# Patient Record
Sex: FEMALE | Race: WHITE | Employment: OTHER | URBAN - METROPOLITAN AREA
[De-identification: names, ages, dates, MRNs, and addresses within clinical notes are randomized per-mention and may not be internally consistent; named-entity substitution may affect disease eponyms.]

---

## 2022-08-14 ENCOUNTER — HOSPITAL ENCOUNTER (OUTPATIENT)
Age: 57
Setting detail: OBSERVATION
Discharge: HOME | End: 2022-08-15
Attending: EMERGENCY MEDICINE | Admitting: SPECIALIST
Payer: COMMERCIAL

## 2022-08-14 ENCOUNTER — APPOINTMENT (OUTPATIENT)
Dept: CT IMAGING | Age: 57
End: 2022-08-14
Payer: COMMERCIAL

## 2022-08-14 ENCOUNTER — APPOINTMENT (OUTPATIENT)
Dept: GENERAL RADIOLOGY | Age: 57
End: 2022-08-14
Payer: COMMERCIAL

## 2022-08-14 DIAGNOSIS — F41.9 ANXIETY: Primary | ICD-10-CM

## 2022-08-14 DIAGNOSIS — R07.9 CHEST PAIN, UNSPECIFIED TYPE: ICD-10-CM

## 2022-08-14 LAB
A/G RATIO: 2 (ref 1.1–2.2)
ALBUMIN SERPL-MCNC: 4.4 G/DL (ref 3.4–5)
ALP BLD-CCNC: 93 U/L (ref 40–129)
ALT SERPL-CCNC: 8 U/L (ref 10–40)
ANION GAP SERPL CALCULATED.3IONS-SCNC: 12 MMOL/L (ref 3–16)
AST SERPL-CCNC: 12 U/L (ref 15–37)
BASOPHILS ABSOLUTE: 0.1 K/UL (ref 0–0.2)
BASOPHILS RELATIVE PERCENT: 0.8 %
BILIRUB SERPL-MCNC: <0.2 MG/DL (ref 0–1)
BUN BLDV-MCNC: 15 MG/DL (ref 7–20)
CALCIUM SERPL-MCNC: 9.8 MG/DL (ref 8.3–10.6)
CHLORIDE BLD-SCNC: 100 MMOL/L (ref 99–110)
CO2: 25 MMOL/L (ref 21–32)
CREAT SERPL-MCNC: 0.6 MG/DL (ref 0.6–1.1)
EOSINOPHILS ABSOLUTE: 0.1 K/UL (ref 0–0.6)
EOSINOPHILS RELATIVE PERCENT: 1.5 %
GFR AFRICAN AMERICAN: >60
GFR NON-AFRICAN AMERICAN: >60
GLUCOSE BLD-MCNC: 108 MG/DL (ref 70–99)
GLUCOSE BLD-MCNC: 113 MG/DL (ref 70–99)
HCT VFR BLD CALC: 42.8 % (ref 36–48)
HEMOGLOBIN: 14.7 G/DL (ref 12–16)
LYMPHOCYTES ABSOLUTE: 2.7 K/UL (ref 1–5.1)
LYMPHOCYTES RELATIVE PERCENT: 32.1 %
MCH RBC QN AUTO: 31.1 PG (ref 26–34)
MCHC RBC AUTO-ENTMCNC: 34.3 G/DL (ref 31–36)
MCV RBC AUTO: 90.7 FL (ref 80–100)
MONOCYTES ABSOLUTE: 0.5 K/UL (ref 0–1.3)
MONOCYTES RELATIVE PERCENT: 6.5 %
NEUTROPHILS ABSOLUTE: 5 K/UL (ref 1.7–7.7)
NEUTROPHILS RELATIVE PERCENT: 59.1 %
PDW BLD-RTO: 13.5 % (ref 12.4–15.4)
PERFORMED ON: ABNORMAL
PLATELET # BLD: 183 K/UL (ref 135–450)
PMV BLD AUTO: 10.1 FL (ref 5–10.5)
POTASSIUM SERPL-SCNC: 3.6 MMOL/L (ref 3.5–5.1)
PRO-BNP: 39 PG/ML (ref 0–124)
RBC # BLD: 4.72 M/UL (ref 4–5.2)
SODIUM BLD-SCNC: 137 MMOL/L (ref 136–145)
TOTAL PROTEIN: 6.6 G/DL (ref 6.4–8.2)
TROPONIN: <0.01 NG/ML
WBC # BLD: 8.4 K/UL (ref 4–11)

## 2022-08-14 PROCEDURE — 6360000004 HC RX CONTRAST MEDICATION: Performed by: EMERGENCY MEDICINE

## 2022-08-14 PROCEDURE — 70450 CT HEAD/BRAIN W/O DYE: CPT

## 2022-08-14 PROCEDURE — 83880 ASSAY OF NATRIURETIC PEPTIDE: CPT

## 2022-08-14 PROCEDURE — 96374 THER/PROPH/DIAG INJ IV PUSH: CPT

## 2022-08-14 PROCEDURE — 99285 EMERGENCY DEPT VISIT HI MDM: CPT

## 2022-08-14 PROCEDURE — 2500000003 HC RX 250 WO HCPCS: Performed by: EMERGENCY MEDICINE

## 2022-08-14 PROCEDURE — 71045 X-RAY EXAM CHEST 1 VIEW: CPT

## 2022-08-14 PROCEDURE — 84484 ASSAY OF TROPONIN QUANT: CPT

## 2022-08-14 PROCEDURE — 85025 COMPLETE CBC W/AUTO DIFF WBC: CPT

## 2022-08-14 PROCEDURE — 74174 CTA ABD&PLVS W/CONTRAST: CPT

## 2022-08-14 PROCEDURE — 36415 COLL VENOUS BLD VENIPUNCTURE: CPT

## 2022-08-14 PROCEDURE — 80053 COMPREHEN METABOLIC PANEL: CPT

## 2022-08-14 PROCEDURE — 6370000000 HC RX 637 (ALT 250 FOR IP): Performed by: EMERGENCY MEDICINE

## 2022-08-14 PROCEDURE — 93005 ELECTROCARDIOGRAM TRACING: CPT

## 2022-08-14 RX ORDER — LABETALOL HYDROCHLORIDE 5 MG/ML
10 INJECTION, SOLUTION INTRAVENOUS ONCE
Status: COMPLETED | OUTPATIENT
Start: 2022-08-14 | End: 2022-08-14

## 2022-08-14 RX ORDER — ASPIRIN 81 MG/1
324 TABLET, CHEWABLE ORAL ONCE
Status: COMPLETED | OUTPATIENT
Start: 2022-08-14 | End: 2022-08-14

## 2022-08-14 RX ADMIN — LABETALOL HYDROCHLORIDE 10 MG: 5 INJECTION, SOLUTION INTRAVENOUS at 22:31

## 2022-08-14 RX ADMIN — IOPAMIDOL 75 ML: 755 INJECTION, SOLUTION INTRAVENOUS at 22:18

## 2022-08-14 RX ADMIN — ASPIRIN 324 MG: 81 TABLET, CHEWABLE ORAL at 21:27

## 2022-08-14 ASSESSMENT — PAIN - FUNCTIONAL ASSESSMENT: PAIN_FUNCTIONAL_ASSESSMENT: 0-10

## 2022-08-14 ASSESSMENT — PAIN SCALES - GENERAL: PAINLEVEL_OUTOF10: 6

## 2022-08-14 ASSESSMENT — PAIN DESCRIPTION - LOCATION: LOCATION: CHEST

## 2022-08-14 ASSESSMENT — PAIN DESCRIPTION - DESCRIPTORS: DESCRIPTORS: PRESSURE

## 2022-08-15 VITALS
DIASTOLIC BLOOD PRESSURE: 95 MMHG | SYSTOLIC BLOOD PRESSURE: 155 MMHG | TEMPERATURE: 97.7 F | RESPIRATION RATE: 18 BRPM | HEIGHT: 70 IN | WEIGHT: 150.13 LBS | OXYGEN SATURATION: 95 % | HEART RATE: 72 BPM | BODY MASS INDEX: 21.49 KG/M2

## 2022-08-15 PROBLEM — R07.9 CHEST PAIN: Status: ACTIVE | Noted: 2022-08-15

## 2022-08-15 LAB — TROPONIN: <0.01 NG/ML

## 2022-08-15 PROCEDURE — G0378 HOSPITAL OBSERVATION PER HR: HCPCS

## 2022-08-15 PROCEDURE — 99244 OFF/OP CNSLTJ NEW/EST MOD 40: CPT | Performed by: INTERNAL MEDICINE

## 2022-08-15 PROCEDURE — 6370000000 HC RX 637 (ALT 250 FOR IP): Performed by: SPECIALIST

## 2022-08-15 PROCEDURE — 36415 COLL VENOUS BLD VENIPUNCTURE: CPT

## 2022-08-15 PROCEDURE — 94760 N-INVAS EAR/PLS OXIMETRY 1: CPT

## 2022-08-15 PROCEDURE — 84484 ASSAY OF TROPONIN QUANT: CPT

## 2022-08-15 RX ORDER — CLONAZEPAM 0.5 MG/1
2 TABLET ORAL EVERY 12 HOURS PRN
Status: DISCONTINUED | OUTPATIENT
Start: 2022-08-15 | End: 2022-08-15

## 2022-08-15 RX ORDER — LISINOPRIL 20 MG/1
20 TABLET ORAL DAILY
Status: ON HOLD | COMMUNITY
End: 2022-08-15

## 2022-08-15 RX ORDER — LISINOPRIL AND HYDROCHLOROTHIAZIDE 12.5; 1 MG/1; MG/1
2 TABLET ORAL DAILY
Qty: 30 TABLET | Refills: 3 | Status: SHIPPED | OUTPATIENT
Start: 2022-08-16

## 2022-08-15 RX ORDER — RANITIDINE HCL 75 MG
75 TABLET ORAL 2 TIMES DAILY
Qty: 60 TABLET | Refills: 3 | Status: SHIPPED | OUTPATIENT
Start: 2022-08-15

## 2022-08-15 RX ORDER — CARVEDILOL 3.12 MG/1
3.12 TABLET ORAL 2 TIMES DAILY
Qty: 180 TABLET | Refills: 1 | Status: SHIPPED | OUTPATIENT
Start: 2022-08-15

## 2022-08-15 RX ORDER — LISINOPRIL AND HYDROCHLOROTHIAZIDE 25; 20 MG/1; MG/1
1 TABLET ORAL DAILY
Status: ON HOLD | COMMUNITY
End: 2022-08-15 | Stop reason: HOSPADM

## 2022-08-15 RX ORDER — IBUPROFEN 600 MG/1
600 TABLET ORAL EVERY 8 HOURS PRN
Status: DISCONTINUED | OUTPATIENT
Start: 2022-08-15 | End: 2022-08-15 | Stop reason: HOSPADM

## 2022-08-15 RX ORDER — GABAPENTIN 300 MG/1
300 CAPSULE ORAL 3 TIMES DAILY
COMMUNITY

## 2022-08-15 RX ORDER — NICOTINE 21 MG/24HR
1 PATCH, TRANSDERMAL 24 HOURS TRANSDERMAL DAILY
Status: DISCONTINUED | OUTPATIENT
Start: 2022-08-15 | End: 2022-08-15 | Stop reason: HOSPADM

## 2022-08-15 RX ORDER — OXYCODONE AND ACETAMINOPHEN 10; 325 MG/1; MG/1
1 TABLET ORAL 4 TIMES DAILY
Status: DISCONTINUED | OUTPATIENT
Start: 2022-08-15 | End: 2022-08-15 | Stop reason: HOSPADM

## 2022-08-15 RX ORDER — CLONAZEPAM 2 MG/1
2 TABLET ORAL 2 TIMES DAILY PRN
Qty: 60 TABLET | Refills: 0 | Status: SHIPPED | OUTPATIENT
Start: 2022-08-15 | End: 2022-09-14

## 2022-08-15 RX ORDER — NICOTINE 21 MG/24HR
1 PATCH, TRANSDERMAL 24 HOURS TRANSDERMAL DAILY
Qty: 30 PATCH | Refills: 3 | Status: SHIPPED | OUTPATIENT
Start: 2022-08-16

## 2022-08-15 RX ORDER — FAMOTIDINE 20 MG/1
40 TABLET, FILM COATED ORAL DAILY
Status: DISCONTINUED | OUTPATIENT
Start: 2022-08-15 | End: 2022-08-15 | Stop reason: ALTCHOICE

## 2022-08-15 RX ORDER — DIVALPROEX SODIUM 500 MG/1
500 TABLET, EXTENDED RELEASE ORAL 3 TIMES DAILY
Status: DISCONTINUED | OUTPATIENT
Start: 2022-08-15 | End: 2022-08-15 | Stop reason: ALTCHOICE

## 2022-08-15 RX ORDER — OXYCODONE AND ACETAMINOPHEN 10; 325 MG/1; MG/1
1 TABLET ORAL EVERY 6 HOURS PRN
Status: ON HOLD | COMMUNITY
End: 2022-08-15 | Stop reason: HOSPADM

## 2022-08-15 RX ORDER — DIVALPROEX SODIUM 500 MG/1
500 TABLET, EXTENDED RELEASE ORAL 3 TIMES DAILY
Qty: 30 TABLET | Refills: 3 | Status: SHIPPED | OUTPATIENT
Start: 2022-08-15

## 2022-08-15 RX ORDER — GABAPENTIN 300 MG/1
300 CAPSULE ORAL 3 TIMES DAILY
Status: DISCONTINUED | OUTPATIENT
Start: 2022-08-15 | End: 2022-08-15 | Stop reason: HOSPADM

## 2022-08-15 RX ORDER — NAPROXEN 500 MG/1
500 TABLET ORAL 2 TIMES DAILY
Qty: 14 TABLET | Refills: 0 | Status: SHIPPED | OUTPATIENT
Start: 2022-08-15 | End: 2022-08-22

## 2022-08-15 RX ORDER — IBUPROFEN 600 MG/1
600 TABLET ORAL EVERY 8 HOURS PRN
Status: ON HOLD | COMMUNITY
End: 2022-08-15 | Stop reason: HOSPADM

## 2022-08-15 RX ORDER — DULOXETIN HYDROCHLORIDE 60 MG/1
60 CAPSULE, DELAYED RELEASE ORAL DAILY
Status: DISCONTINUED | OUTPATIENT
Start: 2022-08-15 | End: 2022-08-15 | Stop reason: HOSPADM

## 2022-08-15 RX ORDER — OXYCODONE AND ACETAMINOPHEN 10; 325 MG/1; MG/1
1 TABLET ORAL EVERY 6 HOURS PRN
Status: DISCONTINUED | OUTPATIENT
Start: 2022-08-15 | End: 2022-08-15

## 2022-08-15 RX ORDER — DULOXETIN HYDROCHLORIDE 60 MG/1
60 CAPSULE, DELAYED RELEASE ORAL DAILY
COMMUNITY

## 2022-08-15 RX ORDER — LISINOPRIL AND HYDROCHLOROTHIAZIDE 12.5; 1 MG/1; MG/1
2 TABLET ORAL DAILY
Status: DISCONTINUED | OUTPATIENT
Start: 2022-08-15 | End: 2022-08-15 | Stop reason: HOSPADM

## 2022-08-15 RX ADMIN — GABAPENTIN 300 MG: 300 CAPSULE ORAL at 08:34

## 2022-08-15 RX ADMIN — OXYCODONE AND ACETAMINOPHEN 1 TABLET: 325; 10 TABLET ORAL at 08:34

## 2022-08-15 RX ADMIN — GABAPENTIN 300 MG: 300 CAPSULE ORAL at 14:42

## 2022-08-15 RX ADMIN — OXYCODONE AND ACETAMINOPHEN 1 TABLET: 325; 10 TABLET ORAL at 12:12

## 2022-08-15 RX ADMIN — DULOXETINE HYDROCHLORIDE 60 MG: 60 CAPSULE, DELAYED RELEASE ORAL at 08:34

## 2022-08-15 RX ADMIN — LISINOPRIL AND HYDROCHLOROTHIAZIDE 2 TABLET: 12.5; 1 TABLET ORAL at 08:34

## 2022-08-15 RX ADMIN — OXYCODONE AND ACETAMINOPHEN 1 TABLET: 325; 10 TABLET ORAL at 16:09

## 2022-08-15 ASSESSMENT — PAIN SCALES - GENERAL
PAINLEVEL_OUTOF10: 7
PAINLEVEL_OUTOF10: 5

## 2022-08-15 NOTE — H&P
[address] [place] [address]                              HISTORY AND PHYSICAL    PATIENT NAME: Sunni Butler                  :        1965  MED REC NO:   [id number]                          ROOM:       3238  ACCOUNT NO:   [de-identified]                           ADMIT DATE: 2022  PROVIDER:     Elaine Hay MD    ATTENDING PROVIDER:  Dr. Gergory. CHIEF COMPLAINT:  Chest pain. HISTORY OF PRESENT ILLNESS:  This 23-year-old female patient came to  emergency room because of the left-sided chest pain in the mid area. The patient is raising up her son _____ -year-old and is causing some  stress at home. The patient's chest pain comes and goes, last about 3  to 4 minutes. No nausea, vomiting, or diaphoresis. The patient's  enzyme is negative. The patient is taking chronic pain medication. The  patient was admitted for 72-hour observation. We are going to call  Cardiology consult for the patient to benefit from this stress test.    PAST MEDICAL HISTORY:  Significant for chronic smoking, hypertension,  COPD, chronic low back pain, taking chronic pain medication, anxiety. FAMILY HISTORY:  History of hypertension. SOCIAL HISTORY:  The patient is , has children. History of  smoking for many years, 1-1/2-pack for 25 years. Occasionally drinks. MEDICATIONS:  See the reconciliation sheet. No current facility-administered medications for this encounter. Current Outpatient Medications   Medication Sig Dispense Refill    gabapentin (NEURONTIN) 300 MG capsule Take 300 mg by mouth in the morning and 300 mg at noon and 300 mg before bedtime. DULoxetine (CYMBALTA) 60 MG extended release capsule Take 60 mg by mouth in the morning. clonazePAM (KLONOPIN) 2 MG tablet Take 1 tablet by mouth 2 times daily as needed for Anxiety for up to 30 days.  60 tablet 0    divalproex (DEPAKOTE ER) 500 MG extended release tablet Take 1 tablet by mouth in the morning and 1 tablet at noon and 1 tablet before bedtime. . 30 tablet 3    naproxen (NAPROSYN) 500 MG tablet Take 1 tablet by mouth in the morning and 1 tablet before bedtime. Do all this for 14 doses. 14 tablet 0    raNITIdine (ZANTAC) 75 MG tablet Take 1 tablet by mouth in the morning and 1 tablet before bedtime. 60 tablet 3    tiotropium (SPIRIVA) 18 MCG inhalation capsule Inhale 1 capsule into the lungs in the morning. 30 capsule 3    lisinopril-hydroCHLOROthiazide (PRINZIDE;ZESTORETIC) 10-12.5 MG per tablet Take 2 tablets by mouth in the morning. 30 tablet 3    nicotine (NICODERM CQ) 14 MG/24HR Place 1 patch onto the skin in the morning. 30 patch 3    carvedilol (COREG) 3.125 MG tablet Take 1 tablet by mouth in the morning and 1 tablet before bedtime. 180 tablet 1        ALLERGIES:  CODEINE. REVIEW OF SYSTEMS:  No headache. No visual symptoms. No swallowing  problem. No ear pain. On and off chest pain. No pleuritic pain. No  cough. She had some chronic back pain. No nausea or vomiting. No  abdominal pain. No urinary symptoms. No leg swelling. PHYSICAL EXAMINATION:  GENERAL:  The patient is alert and oriented, well developed, well  nourished, not in apparent distress. VITAL SIGNS:  The patient's blood pressure was 165/84, respiratory rate  13, pulse 79, and temperature 98.3. The patient weighed 151 pounds. Saturation was 97% on room air. SKIN:  Warm and dry. HEENT:  Head normocephalic, atraumatic. Pupils are equal, both sides,  reactive to light and accommodation. Ears, Nose, and Throat: Within  normal limits. Mucous membranes are moist.  NECK:  Supple. No JVD. No lymphadenopathy. No thyromegaly. CHEST:  Lungs are clear bilaterally. No wheezing. CARDIOVASCULAR:  Heart S1, S2.  Regular rhythm. ABDOMEN:  Soft. Bowel sounds present. No mass. No hepatosplenomegaly. EXTREMITIES:  No edema. No cyanosis.   No clubbing. LABORATORY STUDIES:  WBC count was 8.4, hemoglobin 14.7, hematocrit  42.8, platelet count 669. Sodium was 137, potassium 3.6, chloride 100,  CO2 of 25, BUN 15, creatinine 0.6, glucose was 108. Troponin less than  0.01. CT scan of the chest, minimal atherosclerosis changes in the aorta. No  adenopathy. No infiltrate. Mild degenerative changes on the spine in  L4-5 and L5 degenerative disease. Some COPD changes. No PE.    ASSESSMENT:  Chest pain, history of smoking, hypertension, chronic back  pain, mild atherosclerotic disease of the aorta. PLAN:  The patient will be in observation. We will get Cardiology  consult. If the Cardiology want to do the stress test, it is fine. If  you want to do as an outpatient is also okay. Cardiology will decide  whether the patient can be discharged or do the testing while the  patient is here. Resume home medications. I spent more than 30 minutes on face-to-face evaluation with the  patient. I discussed the management and findings with the patient and  nursing staff.         Logan Talbert MD    D: 08/15/2022 8:13:30       T: 08/15/2022 11:18:30     [email or web address]  Job#: [id number]     Doc#: [id number]    CC:

## 2022-08-15 NOTE — DISCHARGE INSTR - COC
Continuity of Care Form    Patient Name: Syed Murphy   :  1965  MRN:  [id number]    Admit date:  2022  Discharge date:  ***    Code Status Order: Full Code   Advance Directives:     Admitting Physician:  Veronica Avila MD  PCP: Charlies Cushing, MD    Discharging Nurse: [facility] Unit/Room#: G8V-4826/9703-74  Discharging Unit Phone Number: ***    Emergency Contact:   Extended Emergency Contact Information  Primary Emergency Contact: [facility] Phone: [phone]  Relation: Child  Secondary Emergency Contact: [facility] Phone: [phone]  Relation: Parent    Past Surgical History:  Past Surgical History:   Procedure Laterality Date    ECTOPIC PREGNANCY SURGERY         Immunization History: There is no immunization history on file for this patient. Active Problems:  Patient Active Problem List   Diagnosis Code    Fx pisiform-closed S62.163A    Sprain of wrist S63.509A    Ulnar nerve injury S54. 00XA    Hand numbness R20.0    Chest pain R07.9       Isolation/Infection:   Isolation            No Isolation          Patient Infection Status       None to display            Nurse Assessment:  Last Vital Signs: BP (!) 155/95   Pulse 72   Temp 97.7 °F (36.5 °C) (Oral)   Resp 18   Ht 5' 10\" (1.778 m)   Wt 150 lb 2.1 oz (68.1 kg)   SpO2 95%   BMI 21.54 kg/m²     Last documented pain score (0-10 scale): Pain Level: 7  Last Weight:   Wt Readings from Last 1 Encounters:   08/15/22 150 lb 2.1 oz (68.1 kg)     Mental Status:  {IP PT MENTAL STATUS:47138}    IV Access:  { KVNG IV ACCESS:226288008}    Nursing Mobility/ADLs:  Walking   {P DME TIUC:720247050}  Transfer  {P DME ZMJZ:786988070}  Bathing  {P DME VVZJ:067310244}  Dressing  {CHP DME HLRL:312954430}  Toileting  {P DME TZHN:747072926}  Feeding  {P DME CBER:488832086}  Med Admin  {P DME BGWI:560759020}  Med Delivery   { KVNG MED Delivery:556179830}    Wound Care Documentation and Therapy: Elimination:  Continence: Bowel: {YES / XD:41824}  Bladder: {YES / QU:11537}  Urinary Catheter: {Urinary Catheter:687434390}   Colostomy/Ileostomy/Ileal Conduit: {YES / RC:80797}       Date of Last BM: ***    Intake/Output Summary (Last 24 hours) at 8/15/2022 1612  Last data filed at 8/15/2022 1300  Gross per 24 hour   Intake 0 ml   Output --   Net 0 ml     No intake/output data recorded.     Safety Concerns:     [address] [facility] Safety Concerns:903803335}    Impairments/Disabilities:      [address] [facility] Impairments/Disabilities:716927397}    Nutrition Therapy:  Current Nutrition Therapy:   [facility] Diet List:373721156}    Routes of Feeding: {CHP DME Other Feedings:942993256}  Liquids: {Slp liquid thickness:79484}  Daily Fluid Restriction: {CHP DME Yes amt example:608558192}  Last Modified Barium Swallow with Video (Video Swallowing Test): {Done Not Done GNUS:654086668}    Treatments at the Time of Hospital Discharge:   Respiratory Treatments: ***  Oxygen Therapy:  {Therapy; copd oxygen:41485}  Ventilator:    { CC Vent DHQN:297898534}    Rehab Therapies: {THERAPEUTIC INTERVENTION:8821503550}  Weight Bearing Status/Restrictions: [address]  Weight Bearin}  Other Medical Equipment (for information only, NOT a DME order):  {EQUIPMENT:475471322}  Other Treatments: ***    Patient's personal belongings (please select all that are sent with patient):  {[facility] DME Belongings:120696010}    RN SIGNATURE:  {Esignature:297341201}    CASE MANAGEMENT/SOCIAL WORK SECTION    Inpatient Status Date: ***    Readmission Risk Assessment Score:  Readmission Risk              Risk of Unplanned Readmission:  0           Discharging to Facility/ Agency   Name:   Address:  Phone:  Fax:    Dialysis Facility (if applicable)   Name:  Address:  Dialysis Schedule:  Phone:  Fax:    / signature: {Esignature:563644170}    PHYSICIAN SECTION    Prognosis: Good    Condition at Discharge: Stable    Rehab Potential (if transferring to Rehab): Good    Recommended Labs or Other Treatments After Discharge: f/u by PCP in 2 wks    Physician Certification: I certify the above information and transfer of Bry Moore  is necessary for the continuing treatment of the diagnosis listed and that she requires {Admit to Appropriate Level of Care:58594} for {GREATER/LESS:637183649} 30 days.      Update Admission H&P: No change in H&P    PHYSICIAN SIGNATURE:  Electronically signed by Jong Kwon MD on 8/15/22 at [address] [place] [address] EDT

## 2022-08-15 NOTE — ED TRIAGE NOTES
Pt states that since Tuesday she has had increase in stress, chest pressure, tightness in her shoulder blades. Pt states that she woke up soaked in sweat on Wednesday. Pt had some tingling in arms at that time. Pt states she has some confusion where it feels like it is hard to concentrate.

## 2022-08-15 NOTE — ED NOTES
ED SBAR report provider to [facility], RN. Patient to be transported to Room 4259 via stretcher by ED tech. Patient transported with bedside cardiac monitor and with IV medications infusing. IV site clean, dry, and intact. MEWS score and pain assessed as 5/10 and documented. Patient's score on the PARSONS Fall scale reviewed with receiving RN. Updated patient and family on plan of care.        Lori Montes De Oca RN  08/15/22 9298

## 2022-08-15 NOTE — ED PROVIDER NOTES
[facility]      Pt Name: Rosanne Kelly  MRN: [id number]  [place] 1965  Date of evaluation: 8/14/2022  Provider: Lora Mari, [facility]       Chief Complaint   Patient presents with    Chest Pain     Pt states she has been having chest pain since Tuesday, pt has SOB and smokes 1.5PPD. Pt states pain is in shoulder blades, neck, and head as well. HISTORY OF PRESENT ILLNESS   (Location/Symptom, Timing/Onset, Context/Setting, Quality, Duration, Modifying Factors, Severity)  Note limiting factors. Rosanne Kelly is a 65 y.o. female who presents to the emergency department with a complaint of midsternal chest pain described as a dull pressure that occasionally feels like something sharp stabbing her in the chest.  She states that it radiates to the back between her shoulder blades. She reports that the symptoms first began at 6:00 in the morning on Tuesday, 5 days ago. When it first started she states that she felt lightheaded and her vision got blurry for a few seconds but she did not pass out. There was no loss of consciousness. She denies any associated vision loss, slurred speech, facial droop, focal weakness or numbness, difficulty speaking or difficulty walking. She denies any subsequent blurred vision. No current vision disturbance. The patient states that the chest discomfort has been coming and going all day long. It does seem to be worsened with exertion. She also complains of pain in the lower posterior aspect of her neck. She does report shortness of breath associated with this but denies any diaphoresis. She denies any nausea vomiting or diarrhea. She also complains of a mild headache. She describes the headache as mild and generalized and throbbing. She denies any personal or family history of coronary artery disease. She does report a history of hypertension but denies hyperlipidemia or diabetes.   She smokes tobacco 1-1/2 packs/day. She does report that she has been under a lot of stress recently. She denies any recent travel. No leg or calf pain. She denies any recent illness, fever chills cough cold symptoms. Nursing Notes were reviewed. HPI        REVIEW OF SYSTEMS    (2-9 systems for level 4, 10 or more for level 5)       Constitutional: Negative for fever or chills. HENT: Negative for rhinorrhea and sore throat. Genitourinary: Negative for flank pain. Negative for dysuria. Negative for hematuria. Musculoskeletal:  Negative edema. Hematological: Negative for adenopathy. All systems are reviewed and are negative except for those listed above in the history of present illness and ROS. PAST MEDICAL HISTORY     Past Medical History:   Diagnosis Date    Chronic mental illness     COPD (chronic obstructive pulmonary disease) ([facility] [address]     Depression     Hypertension          SURGICAL HISTORY       Past Surgical History:   Procedure Laterality Date    ECTOPIC PREGNANCY SURGERY           CURRENT MEDICATIONS       Current Discharge Medication List        CONTINUE these medications which have NOT CHANGED    Details   oxyCODONE-acetaminophen (PERCOCET)  MG per tablet Take 1 tablet by mouth every 6 hours as needed for Pain.      gabapentin (NEURONTIN) 300 MG capsule Take 300 mg by mouth in the morning and 300 mg at noon and 300 mg before bedtime. lisinopril-hydroCHLOROthiazide (PRINZIDE;ZESTORETIC) 20-25 MG per tablet Take 1 tablet by mouth in the morning. DULoxetine (CYMBALTA) 60 MG extended release capsule Take 60 mg by mouth in the morning.       ibuprofen (ADVIL;MOTRIN) 600 MG tablet Take 600 mg by mouth every 8 hours as needed for Pain             ALLERGIES     Codeine    FAMILY HISTORY       Family History   Problem Relation Age of Onset    COPD Other     Hypertension Other     Heart Attack Other     Heart Attack Father           SOCIAL HISTORY       Social History Socioeconomic History    Marital status: Single     Spouse name: None    Number of children: 4    Years of education: None    Highest education level: None   Tobacco Use    Smoking status: Every Day     Packs/day: 1.00     Years: 20.00     Pack years: 20.00     Types: Cigarettes    Smokeless tobacco: Never   Substance and Sexual Activity    Alcohol use: No       SCREENINGS   NIH Stroke Scale  Interval: Baseline  Level of Consciousness (1a): Alert  LOC Questions (1b): Answers both correctly  LOC Commands (1c): Performs both tasks correctly  Best Gaze (2): Normal  Visual (3): No visual loss  Facial Palsy (4): Normal symmetrical movement  Motor Arm, Left (5a): No drift  Motor Arm, Right (5b): No drift  Motor Leg, Left (6a): No drift  Motor Leg, Right (6b): No drift  Limb Ataxia (7): Absent  Sensory (8): Normal  Best Language (9): No aphasia  Dysarthria (10): Normal  Extinction and Inattention (11): No abnormality  Total: 0Glasgow Coma Scale  Eye Opening: Spontaneous  Best Verbal Response: Oriented  Best Motor Response: Obeys commands  [place] Coma Scale Score: 15        PHYSICAL EXAM    (up to 7 for level 4, 8 or more for level 5)     ED Triage Vitals [08/14/22 2053]   BP Temp Temp Source Heart Rate Resp SpO2 Height Weight   (!) 183/104 98.3 °F (36.8 °C) Oral 94 18 98 % 5' 10\" (1.778 m) 151 lb 0.2 oz (68.5 kg)         Physical Exam   Constitutional: Awake and alert. Very pleasant. Appears comfortable. No current chest pain. Head: No visible evidence of trauma. Normocephalic. Eyes: Pupils equal and reactive. No photophobia. Conjunctiva normal.    HENT: Oral mucosa moist.  Airway patent. Pharynx without erythema. Nares were clear. Neck:  Soft and supple. Nontender. Heart:  Regular rate and rhythm. No murmur. Lungs:  Clear to auscultation. No wheezes, rales, or ronchi. No conversational dyspnea or accessory muscle use. Chest: Chest wall non-tender. No evidence of trauma.   Abdomen:  Soft, nondistended, bowel sounds present. Nontender. No guarding rigidity or rebound. No masses. Musculoskeletal: Extremities non-tender with full range of motion. Radial and dorsalis pedis pulses were intact. No calf tenderness erythema or edema. Neurological: Alert and oriented x 3. No dysarthria. No aphasia. No pronator drift.  strength equal bilaterally. Negative finger-nose. Negative heel-to-shin. Speech clear. Cranial nerves II-XII intact. No facial droop. No acute focal motor or sensory deficits. NIH stroke scale 0. Skin: Skin is warm and dry. No rash. Lymphatic:  No lympadenopathy. Psychiatric: Normal mood and affect. Behavior is normal.         DIAGNOSTIC RESULTS     EKG: All EKG's are interpreted by the Emergency Department Physician who either signs or Co-signs this chart in the absence of a cardiologist.    Normal sinus rhythm. Rate 92. ID interval 148 ms. QRS duration 80 ms. QTc 427 ms.  R axis 67 degrees. No ST elevation. Left ventricular hypertrophy. Nonspecific T wave abnormalities    RADIOLOGY:   Non-plain film images such as CT, Ultrasound and MRI are read by the radiologist. Plain radiographic images are visualized and preliminarily interpreted by the emergency physician with the below findings:        Interpretation per the Radiologist below, if available at the time of this note:    CTA CHEST [address]   Final Result   1. No thoracic or abdominal aortic aneurysm or dissection. Moderate   atherosclerosis of the infrarenal aorta. 2.  No evidence of pulmonary arterial embolism. 3.  COPD but no pneumonia, pleural effusion, or pneumothorax. 4.  Some constipation but no sign of appendicitis or colonic diverticulitis. No ascites or pneumoperitoneum. CT HEAD WO CONTRAST   Preliminary Result   No acute intracranial abnormality. XR CHEST PORTABLE   Final Result   No acute abnormality. Emphysema.                ED BEDSIDE ULTRASOUND:   Performed by ED Physician - none    LABS:  Labs Reviewed   COMPREHENSIVE METABOLIC PANEL - Abnormal; Notable for the following components:       Result Value    Glucose 108 (*)     ALT 8 (*)     AST 12 (*)     All other components within normal limits   POCT GLUCOSE - Abnormal; Notable for the following components:    POC Glucose 113 (*)     All other components within normal limits   CBC WITH AUTO DIFFERENTIAL   TROPONIN   BRAIN NATRIURETIC PEPTIDE   TROPONIN       All other labs were within normal range or not returned as of this dictation. EMERGENCY DEPARTMENT COURSE and DIFFERENTIAL DIAGNOSIS/MDM:   Vitals:    Vitals:    08/15/22 0120 08/15/22 0632 08/15/22 0840 08/15/22 1033   BP: (!) 167/91 (!) 182/81 (!) 190/89 (!) 185/93   Pulse: 77 75 73 75   Resp: 18 18     Temp: 98 °F (36.7 °C) 98.2 °F (36.8 °C) 97.7 °F (36.5 °C) 98 °F (36.7 °C)   TempSrc: Oral Oral Oral Oral   SpO2: 97% 97% 96% 94%   Weight: 146 lb 13.2 oz (66.6 kg) 150 lb 2.1 oz (68.1 kg)     Height:             MDM        The patient presents to the emergency department the complaint of chest discomfort as noted above that has been intermittent for the past 5 days. She states that it is getting more frequent and does seem to be exertional.    EKG was obtained which reveals normal sinus rhythm. No ST elevation. She is mildly hypertensive with a blood pressure of 183/104. She was given aspirin 324 mg p.o. Given the nature of her chest pain radiating to the back and some lower posterior neck pain, CTA chest abdomen and pelvis was obtained to rule out dissection. Pulses are symmetrical bilaterally. She was given labetalol 10 mg IV. REASSESSMENT          CT chest is negative. No evidence of dissection. Patient is pain-free. Troponin is normal.  She will be admitted for further treatment and evaluation. Call was placed to the primary care physician on-call for admission, Dr. Reny Buenrostro.         CRITICAL CARE TIME   Total Critical Care time was 0 minutes, excluding separately reportable procedures. There was a high probability of clinically significant/life threatening deterioration in the patient's condition which required my urgent intervention. CONSULTS:  IP CONSULT TO CARDIOLOGY    PROCEDURES:  Unless otherwise noted below, none     Procedures        FINAL IMPRESSION      1. Chest pain, unspecified type          DISPOSITION/PLAN   DISPOSITION Admitted 08/15/2022 12:44:16 AM      PATIENT REFERRED TO:  No follow-up provider specified. DISCHARGE MEDICATIONS:  Current Discharge Medication List        Controlled Substances Monitoring:     No flowsheet data found. (Please note that portions of this note were completed with a voice recognition program.  Efforts were made to edit the dictations but occasionally words are mis-transcribed. )    [id number] Brown DO (electronically signed)  Attending Emergency Physician          Leila Mckeon DO  08/15/22 5631

## 2022-08-15 NOTE — PROGRESS NOTES
Written and verbal discharge instructions given. IV saline lock removed. Patient calling her friends and family for a ride home.

## 2022-08-15 NOTE — DISCHARGE INSTR - DIET
Good nutrition is important when healing from an illness, injury, or surgery. Follow any nutrition recommendations given to you during your hospital stay. If you were given an oral nutrition supplement while in the hospital, continue to take this supplement at home. You can take it with meals, in-between meals, and/or before bedtime. These supplements can be purchased at most local grocery stores, pharmacies, and chain [employer]-stores. If you have any questions about your diet or nutrition, call the hospital and ask for the dietitian.     Regular - as tolerated

## 2022-08-15 NOTE — CARE COORDINATION
Verified address, lives with 3 grown children in a 1 story house with 1 RAFAEL. Uses [facility] on [id number], denies any barriers, [employer]       08/15/22 1308   Service Assessment   Patient Orientation Alert and Oriented;Person;Place;Situation   Cognition Alert   History Provided By Patient   Primary Irlanda 1 is: Legal Next of Kin   PCP Verified by CM Yes   Prior Functional Level Independent in ADLs/IADLs   Current Functional Level Independent in ADLs/IADLs   Can patient return to prior living arrangement Yes   Ability to make needs known: Good   Family able to assist with home care needs: Yes   Financial Resources Medicaid   Social/Functional History   Lives With Daughter; Son   Type of [address] One level   [address] to enter without rails   Entrance Stairs - Number of Steps 1 RAFAEL   Receives Help From Family   ADL Assistance Independent   Homemaking Assistance Independent   Ambulation Assistance Independent   Transfer Assistance Independent   Active  Yes   Discharge Planning   Type of [address] Prior To Admission None   Potential Assistance Needed N/A   DME Ordered?  No   Potential Assistance Purchasing Medications No   Type of Home Care Services None   Patient expects to be discharged to: House   One/Two Story Residence One story   [facility] Discharge   Services [facility] Discharge None     Carlos Whelan RN, BSN,   [phone]  Electronically signed by Carlos Whelan RN on 8/15/2022 at 1:10 PM

## 2022-08-15 NOTE — ACP (ADVANCE CARE PLANNING)
Advance Care Planning     Advance Care Planning Activator (Inpatient)  Conversation Note      Date of ACP Conversation: 8/15/2022     Conversation Conducted with: Patient with Decision Making Capacity    ACP Activator: Carlos Whelan RN    Health Care Decision Maker:     Current Designated Health Care Decision Maker:     Primary Decision Maker: Reji Small - Child - [phone]    Today we documented Decision Maker(s) consistent with Legal Next of Kin hierarchy. Care Preferences    Ventilation: \"If you were in your present state of health and suddenly became very ill and were unable to breathe on your own, what would your preference be about the use of a ventilator (breathing machine) if it were available to you? \"      Would the patient desire the use of ventilator (breathing machine)?: yes    \"If your health worsens and it becomes clear that your chance of recovery is unlikely, what would your preference be about the use of a ventilator (breathing machine) if it were available to you? \"     Would the patient desire the use of ventilator (breathing machine)?: unsure      Resuscitation  \"CPR works best to restart the heart when there is a sudden event, like a heart attack, in someone who is otherwise healthy. Unfortunately, CPR does not typically restart the heart for people who have serious health conditions or who are very sick. \"    \"In the event your heart stopped as a result of an underlying serious health condition, would you want attempts to be made to restart your heart (answer \"yes\" for attempt to resuscitate) or would you prefer a natural death (answer \"no\" for do not attempt to resuscitate)? \" yes       [] Yes   [] No   Educated Patient / Dufm Staggers regarding differences between Advance Directives and portable DNR orders.     Length of ACP Conversation in minutes:  5 min    Conversation Outcomes:  [x] ACP discussion completed  [] Existing advance directive reviewed with patient; no changes to patient's previously recorded wishes  [] New Advance Directive completed  [] Portable Do Not Rescitate prepared for Provider review and signature  [] POLST/POST/MOLST/MOST prepared for Provider review and signature      Follow-up plan:    [] Schedule follow-up conversation to continue planning  [] Referred individual to Provider for additional questions/concerns   [] Advised patient/agent/surrogate to review completed ACP document and update if needed with changes in condition, patient preferences or care setting    [x] This note routed to one or more involved healthcare providers        Jatin Arellano RN, BSN,   [phone]  Electronically signed by Jatin Arellano RN on 8/15/2022 at 1:12 PM

## 2022-08-15 NOTE — PLAN OF CARE
Problem: Discharge Planning  Goal: Discharge to home or other facility with appropriate resources  8/83/5395 0308 by Tai King RN  Outcome: Completed  0/87/7841 6998 by Tai King RN  Outcome: Progressing  4/10/2963 2884 by Tai King RN  Outcome: Progressing     Problem: Pain  Goal: Verbalizes/displays adequate comfort level or baseline comfort level  0/37/3194 9840 by Tai King RN  Outcome: Completed  5/29/4274 5365 by Tai King RN  Outcome: Progressing  2/99/6063 1660 by Tai King RN  Outcome: Progressing     Problem: Safety - Adult  Goal: Free from fall injury  3/72/4604 2171 by Tai King RN  Outcome: Completed  4/36/1653 5070 by Tai King RN  Outcome: Progressing  1/46/5621 7355 by Tai King RN  Outcome: Progressing

## 2022-08-15 NOTE — CONSULTS
[address] [facility] Sanchez  1965    August 15, 2022    Reason for Consult: Chest Pain    CC: Chest Pain    HPI:  The patient is 65 y.o. female with a past medical history significant for STEPHAN, chronic back pain, COPD, essential hypertension and tobacco abuse who presented to [facility] ED with chest pain. Yani Parrish reports that she has had constant chest pain since last Tuesday (6 days ago). The pain started all of the sudden out of the blue. She describes it as a soreness that occasionally gets \"real tight\". She does walk for exercise but has not done much lately. She cleans her house but this does not change the chest pain at all. The pain is there when she goes to sleep at nbight and is there in the morning. She adds again It's like a \"Shubham Horse\". She does not higher blood pressures lately. She cites a lot of stress in her life as well. She describes palpitations. Review of Systems:  Constitutional: No fatigue, weakness, night sweats or fever. HEENT: No new vision difficulties or ringing in the ears. Respiratory: No new SOB, PND, orthopnea or cough. Cardiovascular: See HPI   GI: No n/v, diarrhea, constipation, abdominal pain or changes in bowel habits. No melena, no hematochezia  : No urinary frequency, urgency, incontinence, hematuria or dysuria. Skin: No cyanosis or skin lesions. Musculoskeletal: Positive for chronic back pain. No new muscle or joint pain. Neurological: No syncope or TIA-like symptoms. Psychiatric: Positive for anxiety.  No insomnia or depression     Past Medical History:   Diagnosis Date    Chronic mental illness     COPD (chronic obstructive pulmonary disease) ([facility] [address]     Depression     Hypertension      Past Surgical History:   Procedure Laterality Date    ECTOPIC PREGNANCY SURGERY       Family History   Problem Relation Age of Onset    COPD Other     Hypertension Other     Heart Attack Other     Heart Attack Father 66's Social History     Tobacco Use    Smoking status: Every Day     Packs/day: 1.5     Years: 20.00     Pack years: 20.00     Types: Cigarettes    Smokeless tobacco: Never   Substance Use Topics    Alcohol use: No       Allergies   Allergen Reactions    Codeine Nausea And Vomiting     Current Facility-Administered Medications   Medication Dose Route Frequency Provider Last Rate Last Admin    nicotine (NICODERM CQ) 14 MG/24HR 1 patch  1 patch TransDERmal Daily La Nena Clark MD   1 patch at 08/15/22 0645    DULoxetine (CYMBALTA) extended release capsule 60 mg  60 mg Oral Daily La Nena Clark MD   60 mg at 08/15/22 0834    gabapentin (NEURONTIN) capsule 300 mg  300 mg Oral TID La Nena Clark MD   300 mg at 08/15/22 1442    ibuprofen (ADVIL;MOTRIN) tablet 600 mg  600 mg Oral Q8H PRN La Nena Clark MD        lisinopril-hydroCHLOROthiazide (PRINZIDE;ZESTORETIC) 10-12.5 MG per tablet 2 tablet  2 tablet Oral Daily La Nena Clark MD   2 tablet at 08/15/22 0834    oxyCODONE-acetaminophen (PERCOCET)  MG per tablet 1 tablet  1 tablet Oral 4x daily La Nena Clark MD   1 tablet at 08/15/22 1212       Physical Exam:   BP (!) 185/93   Pulse 75   Temp 98 °F (36.7 °C) (Oral)   Resp 18   Ht 5' 10\" (1.778 m)   Wt 150 lb 2.1 oz (68.1 kg)   SpO2 94%   BMI 21.54 kg/m²     Intake/Output Summary (Last 24 hours) at 8/15/2022 1516  Last data filed at 8/15/2022 1300  Gross per 24 hour   Intake 0 ml   Output --   Net 0 ml     Wt Readings from Last 2 Encounters:   08/15/22 150 lb 2.1 oz (68.1 kg)   09/29/14 148 lb (67.1 kg)     Constitutional: She is oriented to person, place, and time. She appears well-developed and well-nourished. In no acute distress. Head: Normocephalic and atraumatic. Neck: Neck supple. No JVD present. Carotid bruit is not present. No mass and no thyromegaly present. No lymphadenopathy present. Cardiovascular: Normal rate, regular rhythm, normal heart sounds and intact distal pulses.   Exam reveals no gallop and no friction rub. No murmur heard. Pulmonary/Chest: Effort normal and breath sounds normal. No respiratory distress. She has no wheezes, rhonchi or rales. Abdominal: Soft, non-tender. Bowel sounds and aorta are normal. She exhibits no organomegaly, mass or bruit. Extremities: No edema, cyanosis, or clubbing. Pulses are 2+ radial/carotid/dorsalis pedis and posterior tibial bilaterally. Neurological: She is alert and oriented to person, place, and time. She has normal reflexes. No cranial nerve deficit. Coordination normal.   Skin: Skin is warm and dry. There is no rash or diaphoresis. Psychiatric: She has a normal mood and affect. Her speech is normal and behavior is normal.     Personally reviewed and interpreted   EKG Interpretation: Normal sinus rhythm with nonspecific ST changes    Lab Review:   Lab Results   Component Value Date/Time    TRIG 84 06/09/2010 09:40 AM    HDL 62 06/09/2010 09:40 AM    LDLCALC 72 06/09/2010 09:40 AM    LABVLDL 17 06/09/2010 09:40 AM     Lab Results   Component Value Date/Time     08/14/2022 09:27 PM    K 3.6 08/14/2022 09:27 PM    BUN 15 08/14/2022 09:27 PM    CREATININE 0.6 08/14/2022 09:27 PM     Recent Labs     08/14/22  2127   WBC 8.4   HGB 14.7   HCT 42.8        Chest CT 8/14/22:  Abdominal aorta/Branches: There is no abdominal aortic aneurysm, dissection,   or filling defect. The celiac root, superior mesenteric artery, and   bilateral single renal arteries are patent. Moderate atherosclerosis is   present of the infrarenal aorta with relatively heavy calcification more than   noncalcified plaque. Iliac branches and included proximal femoral branches   are patent. There is no retroperitoneal hematoma. Organs: Early arterial phase study and has streak artifact from the arms at   the sides. No apparent mass or surface nodularity at the liver. The spleen   is not enlarged. There is no surrounding fluid at the liver or spleen.   The pancreas, gallbladder, and adrenal glands are unremarkable. Kidneys are   enhancing equally without acute injury or surrounding hematoma. There is no   hydronephrosis or hydroureter on either side. GI/Bowel: Stomach, small bowel, and colon are not dilated. There is mild   constipation throughout the colon and rectum. There is no sign of focal   colonic diverticulitis. Appendix is identified and no appendicitis. There   is no ascites, hemoperitoneum, or pneumoperitoneum. Pelvis: Urinary bladder is well distended without wall thickening. The   uterus is not enlarged. Peritoneum/Retroperitoneum: There is no retroperitoneal hematoma or bulky   lymphadenopathy. Bones/Soft Tissues: There is no soft tissue emphysema or focal fluid   collection at the abdominal wall. Metallic focus at the umbilicus is likely   jewelry. No acute fracture or dislocation of the pelvis. Disc disease at   L4-5 and L5-S1. Assessment / Plan:    Atypical Chest Pain  Tita's chest pain is very atypical for angina. I has been constant for about a week. Her chest is tender to palpitation as well suggesting a musculoskeletal injury. Her troponin assays and her ECG are unremarkable. I would not pursue this from an ischemic standpoint. 2. Essential Hypertension  Not controlled on lisinopril-HCTZ. I would consider adding carvedilol 6.25mg bid which can be titrated up as an outpatient [facility]. 3. Tobacco Abuse  I encouraged her in complete smoking cessation. We will sign off for now. Thank you for allowing us to see her. Okay to discharge to home from a cardiac standpoint.

## 2022-08-15 NOTE — PLAN OF CARE
Problem: Discharge Planning  Goal: Discharge to home or other facility with appropriate resources  4/76/1950 8364 by Cabrera Wilson RN  Outcome: Progressing  8/58/5852 7024 by Cabrera Wilson RN  Outcome: Progressing     Problem: Pain  Goal: Verbalizes/displays adequate comfort level or baseline comfort level  2/46/9235 5803 by Cabrera Wilson RN  Outcome: Progressing  9/91/7326 6846 by Cabrera Wilson RN  Outcome: Progressing     Problem: Safety - Adult  Goal: Free from fall injury  2/89/8268 7045 by Cabrera Wilson RN  Outcome: Progressing  3/09/3941 7460 by Cabrera Wilson RN  Outcome: Progressing

## 2022-08-15 NOTE — H&P
H & P dictated  232 [id number]  Chest pain, Atypical vs Angina  Essential HTN  Long Hx of smoking,  COPD mild one  Chronic back pain,  Bipolar d/o,  Stress at home,  Pt 's enzyme 1 st set neg, 2nd pending,  Cardio consult, if cardiology would like to do stress test here it  is OK can be done as OP also. Pt is 72 hr obs. Resume home meds,   Dr Lor Arboleda covering dr Robert Gordon.

## 2022-08-16 NOTE — DISCHARGE SUMMARY
[address] [facility]                               DIS[facility]GE [facility]    PATIENT NAME: Benny Mg                  :        1965  MED REC NO:   [id number]                          ROOM:       4259  ACCOUNT NO:   [de-identified]                           ADMIT DATE: 2022  PROVIDER:     Saleem Campos MD                  DISCHARGE DATE:  08/15/2022    DISCHARGE DIAGNOSES:  Atypical chest pain, negative cardiac enzymes,  hypertension, chronic smoking, chronic neck pain, and bipolar disorder. DISCHARGE SUMMARY:  This 60-year-old female patient with past medical  history significant for anxiety, chronic neck pain, COPD, hypertension,  and smoking presented to the Emergency Department with chest pain. The  patient's cardiac enzymes were within normal limits. The patient has  been evaluated with his cardiologist, Dr. Sybil Smalls. He feels that the  patient's chest pain is very atypical, mostly _____ on the left side is  likely muscle type of tightness. The patient's pressure was under  control. She is taking nicotine patch. Dr. Sybil Smalls feels comfortable  to send the patient home and is going to follow up on an outpatient  basis. EKG was unremarkable and the patient will follow up with primary  care physician. Dr. Sybil Smalls recommended carvedilol 6.25 b.i.d. We are  going to start it on an outpatient basis. CONDITION ON DISCHARGE:  Stable. COMPLICATIONS:  None. DISCHARGE MEDICATIONS:  See the reconciliation sheet. No current facility-administered medications for this encounter. Current Outpatient Medications   Medication Sig Dispense Refill    gabapentin (NEURONTIN) 300 MG capsule Take 300 mg by mouth in the morning and 300 mg at noon and 300 mg before bedtime. DULoxetine (CYMBALTA) 60 MG extended release capsule Take 60 mg by mouth in the morning.       clonazePAM (KLONOPIN) 2 MG tablet Take 1 tablet by mouth 2 times daily as needed for Anxiety for up to 30 days. 60 tablet 0    divalproex (DEPAKOTE ER) 500 MG extended release tablet Take 1 tablet by mouth in the morning and 1 tablet at noon and 1 tablet before bedtime. . 30 tablet 3    naproxen (NAPROSYN) 500 MG tablet Take 1 tablet by mouth in the morning and 1 tablet before bedtime. Do all this for 14 doses. 14 tablet 0    raNITIdine (ZANTAC) 75 MG tablet Take 1 tablet by mouth in the morning and 1 tablet before bedtime. 60 tablet 3    tiotropium (SPIRIVA) 18 MCG inhalation capsule Inhale 1 capsule into the lungs in the morning. 30 capsule 3    lisinopril-hydroCHLOROthiazide (PRINZIDE;ZESTORETIC) 10-12.5 MG per tablet Take 2 tablets by mouth in the morning. 30 tablet 3    nicotine (NICODERM CQ) 14 MG/24HR Place 1 patch onto the skin in the morning. 30 patch 3    carvedilol (COREG) 3.125 MG tablet Take 1 tablet by mouth in the morning and 1 tablet before bedtime. 180 tablet 1        FOLLOWUP:  The patient will follow up with PCP in two weeks after  discharge. I spent more than 30 minutes with the discharge instruction and  paperwork.         Marguerite Frankel, MD    D: 08/15/2022 16:18:11       T: 08/15/2022 20:36:56     [email or web address]  Job#: [id number]     Doc#: [id number]    CC:

## 2022-08-18 LAB
EKG ATRIAL RATE: 92 BPM
EKG DIAGNOSIS: NORMAL
EKG P AXIS: 31 DEGREES
EKG P-R INTERVAL: 148 MS
EKG Q-T INTERVAL: 346 MS
EKG QRS DURATION: 80 MS
EKG QTC CALCULATION (BAZETT): 427 MS
EKG R AXIS: 67 DEGREES
EKG T AXIS: 9 DEGREES
EKG VENTRICULAR RATE: 92 BPM

## 2023-11-03 ENCOUNTER — HOSPITAL ENCOUNTER (EMERGENCY)
Age: 58
Discharge: HOME | End: 2023-11-03
Payer: COMMERCIAL

## 2023-11-03 ENCOUNTER — APPOINTMENT (OUTPATIENT)
Dept: CT IMAGING | Age: 58
End: 2023-11-03
Payer: COMMERCIAL

## 2023-11-03 VITALS
RESPIRATION RATE: 18 BRPM | OXYGEN SATURATION: 98 % | HEART RATE: 78 BPM | DIASTOLIC BLOOD PRESSURE: 113 MMHG | TEMPERATURE: 98.1 F | SYSTOLIC BLOOD PRESSURE: 171 MMHG

## 2023-11-03 DIAGNOSIS — S32.10XA CLOSED FRACTURE OF SACRUM, UNSPECIFIED PORTION OF SACRUM, INITIAL ENCOUNTER (HCC): Primary | ICD-10-CM

## 2023-11-03 PROCEDURE — 72131 CT LUMBAR SPINE W/O DYE: CPT

## 2023-11-03 PROCEDURE — 99284 EMERGENCY DEPT VISIT MOD MDM: CPT

## 2023-11-03 ASSESSMENT — PAIN - FUNCTIONAL ASSESSMENT: PAIN_FUNCTIONAL_ASSESSMENT: 0-10

## 2023-11-03 ASSESSMENT — PAIN SCALES - GENERAL: PAINLEVEL_OUTOF10: 8

## 2023-11-04 NOTE — ED NOTES
Pt discharged home in stable condition. Vitals stable and no iv in place. Anila Rosenbaum aware of pt's bp at time of dc. Advised her to continue prescribed bp medication and follow up with pcp. Pt verbalized understanding. Ambulatory.  Ok Sabina Aguirre  11/03/23 9746

## 2024-04-16 LAB — CA 125: 22 UNIT/ML (ref 0–35)

## 2025-02-23 ENCOUNTER — HOSPITAL ENCOUNTER (EMERGENCY)
Age: 60
Discharge: HOME | End: 2025-02-24
Attending: EMERGENCY MEDICINE
Payer: COMMERCIAL

## 2025-02-23 ENCOUNTER — APPOINTMENT (OUTPATIENT)
Dept: GENERAL RADIOLOGY | Age: 60
End: 2025-02-23
Payer: COMMERCIAL

## 2025-02-23 VITALS
SYSTOLIC BLOOD PRESSURE: 144 MMHG | TEMPERATURE: 98.2 F | OXYGEN SATURATION: 93 % | RESPIRATION RATE: 20 BRPM | WEIGHT: 163.58 LBS | BODY MASS INDEX: 22.9 KG/M2 | HEIGHT: 71 IN | HEART RATE: 99 BPM | DIASTOLIC BLOOD PRESSURE: 108 MMHG

## 2025-02-23 DIAGNOSIS — Z76.0 ENCOUNTER FOR MEDICATION REFILL: ICD-10-CM

## 2025-02-23 DIAGNOSIS — R07.89 CHEST WALL PAIN: Primary | ICD-10-CM

## 2025-02-23 LAB
ALBUMIN SERPL-MCNC: 3.8 G/DL (ref 3.4–5)
ALBUMIN/GLOB SERPL: 1.3 {RATIO} (ref 1.1–2.2)
ALP SERPL-CCNC: 98 U/L (ref 40–129)
ALT SERPL-CCNC: 14 U/L (ref 10–40)
ANION GAP SERPL CALCULATED.3IONS-SCNC: 12 MMOL/L (ref 3–16)
AST SERPL-CCNC: 22 U/L (ref 15–37)
BASOPHILS # BLD: 0 K/UL (ref 0–0.2)
BASOPHILS NFR BLD: 0.6 %
BILIRUB SERPL-MCNC: 0.3 MG/DL (ref 0–1)
BUN SERPL-MCNC: 9 MG/DL (ref 7–20)
CALCIUM SERPL-MCNC: 9.5 MG/DL (ref 8.3–10.6)
CHLORIDE SERPL-SCNC: 104 MMOL/L (ref 99–110)
CO2 SERPL-SCNC: 27 MMOL/L (ref 21–32)
CREAT SERPL-MCNC: 0.5 MG/DL (ref 0.6–1.1)
DEPRECATED RDW RBC AUTO: 13.6 % (ref 12.4–15.4)
EOSINOPHIL # BLD: 0.2 K/UL (ref 0–0.6)
EOSINOPHIL NFR BLD: 2 %
GFR SERPLBLD CREATININE-BSD FMLA CKD-EPI: >90 ML/MIN/{1.73_M2}
GLUCOSE SERPL-MCNC: 110 MG/DL (ref 70–99)
HCT VFR BLD AUTO: 43.2 % (ref 36–48)
HGB BLD-MCNC: 14.7 G/DL (ref 12–16)
LYMPHOCYTES # BLD: 2.2 K/UL (ref 1–5.1)
LYMPHOCYTES NFR BLD: 29.2 %
MCH RBC QN AUTO: 30.8 PG (ref 26–34)
MCHC RBC AUTO-ENTMCNC: 34 G/DL (ref 31–36)
MCV RBC AUTO: 90.8 FL (ref 80–100)
MONOCYTES # BLD: 0.6 K/UL (ref 0–1.3)
MONOCYTES NFR BLD: 7.8 %
NEUTROPHILS # BLD: 4.6 K/UL (ref 1.7–7.7)
NEUTROPHILS NFR BLD: 60.4 %
NT-PROBNP SERPL-MCNC: 40 PG/ML (ref 0–124)
PLATELET # BLD AUTO: 183 K/UL (ref 135–450)
PMV BLD AUTO: 10.6 FL (ref 5–10.5)
POTASSIUM SERPL-SCNC: 3.9 MMOL/L (ref 3.5–5.1)
PROT SERPL-MCNC: 6.7 G/DL (ref 6.4–8.2)
RBC # BLD AUTO: 4.76 M/UL (ref 4–5.2)
SODIUM SERPL-SCNC: 143 MMOL/L (ref 136–145)
TROPONIN, HIGH SENSITIVITY: 8 NG/L (ref 0–14)
WBC # BLD AUTO: 7.6 K/UL (ref 4–11)

## 2025-02-23 PROCEDURE — 80053 COMPREHEN METABOLIC PANEL: CPT

## 2025-02-23 PROCEDURE — 83880 ASSAY OF NATRIURETIC PEPTIDE: CPT

## 2025-02-23 PROCEDURE — 99285 EMERGENCY DEPT VISIT HI MDM: CPT

## 2025-02-23 PROCEDURE — 6370000000 HC RX 637 (ALT 250 FOR IP): Performed by: EMERGENCY MEDICINE

## 2025-02-23 PROCEDURE — 93005 ELECTROCARDIOGRAM TRACING: CPT | Performed by: EMERGENCY MEDICINE

## 2025-02-23 PROCEDURE — 71046 X-RAY EXAM CHEST 2 VIEWS: CPT

## 2025-02-23 PROCEDURE — 84484 ASSAY OF TROPONIN QUANT: CPT

## 2025-02-23 PROCEDURE — 85025 COMPLETE CBC W/AUTO DIFF WBC: CPT

## 2025-02-23 RX ORDER — OXYCODONE AND ACETAMINOPHEN 5; 325 MG/1; MG/1
1 TABLET ORAL ONCE
Status: COMPLETED | OUTPATIENT
Start: 2025-02-23 | End: 2025-02-23

## 2025-02-23 RX ORDER — NAPROXEN 500 MG/1
500 TABLET ORAL 2 TIMES DAILY
Qty: 14 TABLET | Refills: 0 | Status: SHIPPED | OUTPATIENT
Start: 2025-02-23 | End: 2025-03-02

## 2025-02-23 RX ORDER — OXYCODONE AND ACETAMINOPHEN 10; 325 MG/1; MG/1
1 TABLET ORAL EVERY 8 HOURS PRN
Qty: 9 TABLET | Refills: 0 | Status: SHIPPED | OUTPATIENT
Start: 2025-02-23 | End: 2025-02-26

## 2025-02-23 RX ORDER — OXYCODONE HYDROCHLORIDE 5 MG/1
5 TABLET ORAL ONCE
Status: COMPLETED | OUTPATIENT
Start: 2025-02-23 | End: 2025-02-23

## 2025-02-23 RX ORDER — METHOCARBAMOL 750 MG/1
750 TABLET, FILM COATED ORAL 4 TIMES DAILY PRN
Qty: 12 TABLET | Refills: 0 | Status: SHIPPED | OUTPATIENT
Start: 2025-02-23 | End: 2025-02-26

## 2025-02-23 RX ADMIN — OXYCODONE HYDROCHLORIDE AND ACETAMINOPHEN 1 TABLET: 5; 325 TABLET ORAL at 15:38

## 2025-02-23 RX ADMIN — OXYCODONE 5 MG: 5 TABLET ORAL at 15:47

## 2025-02-23 ASSESSMENT — PAIN SCALES - GENERAL
PAINLEVEL_OUTOF10: 10

## 2025-02-23 ASSESSMENT — PAIN - FUNCTIONAL ASSESSMENT: PAIN_FUNCTIONAL_ASSESSMENT: 0-10

## 2025-02-23 ASSESSMENT — PAIN DESCRIPTION - LOCATION: LOCATION: CHEST

## 2025-02-23 NOTE — DISCHARGE INSTRUCTIONS
Thank you for visiting [facility] Emergency Department.    You need to call in morning to make appointment as directed with appropriate doctor.    Should you have any questions regarding your care or further treatment, please call [facility] Emergency Department at [phone].    Take any medications as prescribed, if given any, otherwise for pain Use ibuprofen or Tylenol (unless prescribed medications that have Tylenol in it).  You can take over the counter Ibuprofen (advil) tablets (4 tablets every 8 hours or 3 tablets every 6 hours or 2 tablets every 4 hours)    Return to ED if symptoms worsen, do not improve, fever > 101.5, excessive nausea or vomiting, and unable to follow up with your physician, or any other care or concern.

## 2025-02-23 NOTE — ED PROVIDER NOTES
Determinants: None   Chronic Conditions:   has a past medical history of Chronic mental illness, COPD (chronic obstructive pulmonary disease) (HCC), Depression, and Hypertension.    Records Reviewed: ED visit note at [facility] on 2/3/2025 for chest pain.      Disposition Considerations: Consider possibly admitting the patient to the hospital for her chest pain and further cardiac evaluation but patient's chest pain is reproducible to palpation and her cardiac enzymes are normal therefore low suspicion for ACS and I do feel patient is appropriate for oral medications and outpatient follow-up  I am the Primary Clinician of Record.        FINAL IMPRESSION    1. Chest wall pain    2. Encounter for medication refill           DISPOSITION/PLAN:   DISPOSITION Decision To Discharge 02/23/2025 05:19:22 PM  Condition at Disposition: Stable      PATIENT REFERRED TO:   Garry Zamarripa MD  [address] [facility] [id number] [phone]    Call today  For a follow up appointment.       DISCHARGE MEDICATIONS:   Discharge Medication List as of 2/23/2025  5:31 PM        START taking these medications    Details   oxyCODONE-acetaminophen (ENDOCET)  MG per tablet Take 1 tablet by mouth every 8 hours as needed for Pain for up to 3 days. Intended supply: 30 days Max Daily Amount: 3 tablets, Disp-9 tablet, R-0Normal      naloxone (NARCAN) 4 MG/0.1ML LIQD nasal spray 1 spray by Nasal route as needed for Opioid Reversal, Disp-2 each, R-1Normal      methocarbamol (ROBAXIN-750) 750 MG tablet Take 1 tablet by mouth 4 times daily as needed (spasm), Disp-12 tablet, R-0Normal              DISCONTINUED MEDICATIONS:   Discharge Medication List as of 2/23/2025  5:31 PM                 (Please note that portions of this note were completed with a voice recognition program.  Efforts were made to edit the dictations but occasionally words are mis-transcribed.)       Ruy Woods MD (electronically

## 2025-02-23 NOTE — ED TRIAGE NOTES
Patient presents with mid-sternal chest pain that started a month ago after her son was practicing CPR on her, as a demonstration, not because she was in need of CPR per patient report. Patient states \"it messed up her heart\" Patient states the pain is still present, and she currently is out of her percocet, which will not be refilled until Wednesday. Patient tearful but in no distress otherwise.

## 2025-02-24 LAB
EKG ATRIAL RATE: 90 BPM
EKG DIAGNOSIS: NORMAL
EKG P AXIS: 78 DEGREES
EKG P-R INTERVAL: 158 MS
EKG Q-T INTERVAL: 370 MS
EKG QRS DURATION: 76 MS
EKG QTC CALCULATION (BAZETT): 452 MS
EKG R AXIS: 52 DEGREES
EKG T AXIS: 49 DEGREES
EKG VENTRICULAR RATE: 90 BPM

## 2025-02-24 PROCEDURE — 93010 ELECTROCARDIOGRAM REPORT: CPT | Performed by: INTERNAL MEDICINE
